# Patient Record
Sex: FEMALE | Race: WHITE | Employment: UNEMPLOYED | URBAN - NONMETROPOLITAN AREA
[De-identification: names, ages, dates, MRNs, and addresses within clinical notes are randomized per-mention and may not be internally consistent; named-entity substitution may affect disease eponyms.]

---
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
## 2022-06-29 ENCOUNTER — OFFICE VISIT (OUTPATIENT)
Dept: CARDIOLOGY CLINIC | Age: 22
End: 2022-06-29
Payer: COMMERCIAL

## 2022-06-29 ENCOUNTER — TELEPHONE (OUTPATIENT)
Dept: CARDIOLOGY CLINIC | Age: 22
End: 2022-06-29

## 2022-06-29 VITALS
WEIGHT: 256.4 LBS | SYSTOLIC BLOOD PRESSURE: 136 MMHG | BODY MASS INDEX: 47.18 KG/M2 | HEIGHT: 62 IN | HEART RATE: 100 BPM | DIASTOLIC BLOOD PRESSURE: 82 MMHG

## 2022-06-29 DIAGNOSIS — I51.4 MYOCARDITIS, UNSPECIFIED CHRONICITY, UNSPECIFIED MYOCARDITIS TYPE (HCC): Primary | ICD-10-CM

## 2022-06-29 PROCEDURE — 4004F PT TOBACCO SCREEN RCVD TLK: CPT | Performed by: INTERNAL MEDICINE

## 2022-06-29 PROCEDURE — G8419 CALC BMI OUT NRM PARAM NOF/U: HCPCS | Performed by: INTERNAL MEDICINE

## 2022-06-29 PROCEDURE — G8427 DOCREV CUR MEDS BY ELIG CLIN: HCPCS | Performed by: INTERNAL MEDICINE

## 2022-06-29 PROCEDURE — 99204 OFFICE O/P NEW MOD 45 MIN: CPT | Performed by: INTERNAL MEDICINE

## 2022-06-29 RX ORDER — DESOGESTREL AND ETHINYL ESTRADIOL 0.15-0.03
KIT ORAL
COMMUNITY
Start: 2022-06-01

## 2022-06-29 RX ORDER — MULTIVITAMIN
1 CAPSULE ORAL DAILY
COMMUNITY

## 2022-06-29 RX ORDER — CLOPIDOGREL BISULFATE 75 MG/1
75 TABLET ORAL DAILY
COMMUNITY
End: 2022-07-14 | Stop reason: ALTCHOICE

## 2022-06-29 RX ORDER — ONDANSETRON HYDROCHLORIDE 8 MG/1
TABLET, FILM COATED ORAL
COMMUNITY
Start: 2022-04-22

## 2022-06-29 RX ORDER — FLUOXETINE HYDROCHLORIDE 40 MG/1
CAPSULE ORAL
COMMUNITY
Start: 2022-04-28

## 2022-06-29 RX ORDER — ASPIRIN 81 MG/1
81 TABLET ORAL DAILY
COMMUNITY

## 2022-06-29 RX ORDER — CARVEDILOL 12.5 MG/1
TABLET ORAL
COMMUNITY
Start: 2022-06-08

## 2022-06-29 RX ORDER — AMITRIPTYLINE HYDROCHLORIDE 50 MG/1
TABLET, FILM COATED ORAL
COMMUNITY
Start: 2022-06-27

## 2022-06-29 NOTE — TELEPHONE ENCOUNTER
Records in [facility] from [facility] [address]. Let me know if there are any other records you need.

## 2022-06-29 NOTE — PROGRESS NOTES
intolerance. Social History  Fausto Fuentes  reports that she has never smoked. She does not have any smokeless tobacco history on file. She reports current alcohol use. She reports that she does not use drugs. Family History  Fausto Fuentes family history is not on file. There is no family history of bicuspid aortic valve, aneurysms, heart transplant, pacemakers, defibrillators, or sudden cardiac death. Past Surgical History   History reviewed. No pertinent surgical history. Review of Systems   Constitutional: Negative for chills and fever  HENT: Negative for congestion, sinus pressure, sneezing and sore throat. Eyes: Negative for pain, discharge, redness and itching. Respiratory: Negative for apnea, cough  Gastrointestinal: Negative for blood in stool, constipation, diarrhea   Endocrine: Negative for cold intolerance, heat intolerance, polydipsia. Genitourinary: Negative for dysuria, enuresis, flank pain and hematuria. Musculoskeletal: Negative for arthralgias, joint swelling and neck pain. Neurological: Negative for numbness and headaches. Psychiatric/Behavioral: Negative for agitation, confusion, decreased concentration and dysphoric mood. Objective:     /82   Pulse 100   Ht 5' 2\" (1.575 m)   Wt 256 lb 6.4 oz (116.3 kg)   BMI 46.90 kg/m²     Wt Readings from Last 3 Encounters:   06/29/22 256 lb 6.4 oz (116.3 kg)   03/04/19 220 lb (99.8 kg) (99 %, Z= 2.20)*     * Growth percentiles are based on CDC (Girls, 2-20 Years) data. BP Readings from Last 3 Encounters:   06/29/22 136/82       Nursing note and vitals reviewed. Physical Exam   Constitutional: Oriented to person, place, and time. Appears well-developed and well-nourished. HENT:   Head: Normocephalic and atraumatic. Eyes: EOM are normal. Pupils are equal, round, and reactive to light. Neck: Normal range of motion. Neck supple. No JVD present.    Cardiovascular: Normal rate, regular rhythm, normal heart sounds and intact distal pulses. No murmur heard. Pulmonary/Chest: Effort normal and breath sounds normal. No respiratory distress. No wheezes. No rales. Abdominal: Soft. Bowel sounds are normal. No distension. There is no tenderness. Musculoskeletal: Normal range of motion. No edema. Neurological: Alert and oriented to person, place, and time. No cranial nerve deficit. Coordination normal.   Skin: Skin is warm and dry. Psychiatric: Normal mood and affect. No results found for: CKTOTAL, CKMB, CKMBINDEX    No results found for: WBC, RBC, HGB, HCT, MCV, MCH, MCHC, RDW, PLT, MPV    No results found for: NA, K, CL, CO2, BUN, LABALBU, CREATININE, CALCIUM, GFRAA, LABGLOM, GLUCOSE, GLU    No results found for: ALKPHOS, ALT, AST, PROT, BILITOT, BILIDIR, IBILI, LABALBU    No results found for: MG    No results found for: INR, PROTIME      No results found for: LABA1C    No results found for: TRIG, HDL, LDLCALC, LDLDIRECT, LABVLDL    No results found for: TSH      Testing Reviewed:      I have individually reviewed the cardiac test below:    ECHO: No results found for this or any previous visit. 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 Will check TTE and get outside records. If no EF issues, no MI, no CVA, will de-escalate therapy. She has no physical limitations. Bluish Discoloration of LE- venous congestion? She had work-up, but not available. Will try to obtain. No physical findings currently    Hemorrhoidal bleeding - GI has seen, she is on DAPT, will need to assess need for length of therapy. Discussed diet/exercise/BP/weight loss/health lifestyle choices/lipids; the patient understands the goals and will try to comply.       Disposition:  6-12 months         Electronically signed by Elvis Drake MD   6/29/2022 at 8:06 AM EDT

## 2022-06-29 NOTE — PROGRESS NOTES
NP, patient had COVID 1/2021 and 1/2022. Patient states she saw a cardiologist in [facility] that diagnosed her with myocarditis. She had testing done at [facility] in 3/2021 and results in care everywhere. She in on Aspirin and Plavix which she states is since COVID but unsure why she is on them.

## 2022-07-07 ENCOUNTER — HOSPITAL ENCOUNTER (OUTPATIENT)
Dept: NON INVASIVE DIAGNOSTICS | Age: 22
Discharge: HOME | End: 2022-07-07
Payer: COMMERCIAL

## 2022-07-07 DIAGNOSIS — I51.4 MYOCARDITIS, UNSPECIFIED CHRONICITY, UNSPECIFIED MYOCARDITIS TYPE (HCC): ICD-10-CM

## 2022-07-07 LAB
LV EF: 63 %
LVEF MODALITY: NORMAL

## 2022-07-07 PROCEDURE — 93306 TTE W/DOPPLER COMPLETE: CPT

## 2022-07-08 NOTE — TELEPHONE ENCOUNTER
Echo results anything needed? Summary   Normal left ventricular size and systolic function. There were no regional wall motion abnormalities. Wall thickness was within normal limits.    Ejection fraction was estimated at 60-65%

## 2022-07-11 ENCOUNTER — TELEPHONE (OUTPATIENT)
Dept: CARDIOLOGY CLINIC | Age: 22
End: 2022-07-11

## 2022-07-11 NOTE — TELEPHONE ENCOUNTER
Echo results  Summary   Normal left ventricular size and systolic function. There were no regional wall motion abnormalities. Wall thickness was within normal limits. Ejection fraction was estimated at 60-65%. OV note from   If no EF issues, no MI, no CVA, will de-escalate therapy. She has no physical limitations. You had recently reviewed records from [facility].

## 2022-07-14 NOTE — TELEPHONE ENCOUNTER
Spoke to patient, notified. [address] [facility] updated.

## 2023-01-19 ENCOUNTER — OFFICE VISIT (OUTPATIENT)
Dept: CARDIOLOGY CLINIC | Age: 23
End: 2023-01-19
Payer: COMMERCIAL

## 2023-01-19 VITALS
HEART RATE: 100 BPM | SYSTOLIC BLOOD PRESSURE: 132 MMHG | WEIGHT: 250.2 LBS | BODY MASS INDEX: 46.04 KG/M2 | HEIGHT: 62 IN | DIASTOLIC BLOOD PRESSURE: 86 MMHG

## 2023-01-19 DIAGNOSIS — I51.4 MYOCARDITIS, UNSPECIFIED CHRONICITY, UNSPECIFIED MYOCARDITIS TYPE (HCC): ICD-10-CM

## 2023-01-19 DIAGNOSIS — I10 HYPERTENSION, UNSPECIFIED TYPE: Primary | ICD-10-CM

## 2023-01-19 PROCEDURE — 99213 OFFICE O/P EST LOW 20 MIN: CPT | Performed by: INTERNAL MEDICINE

## 2023-01-19 PROCEDURE — 4004F PT TOBACCO SCREEN RCVD TLK: CPT | Performed by: INTERNAL MEDICINE

## 2023-01-19 PROCEDURE — G8417 CALC BMI ABV UP PARAM F/U: HCPCS | Performed by: INTERNAL MEDICINE

## 2023-01-19 PROCEDURE — G8427 DOCREV CUR MEDS BY ELIG CLIN: HCPCS | Performed by: INTERNAL MEDICINE

## 2023-01-19 PROCEDURE — 3079F DIAST BP 80-89 MM HG: CPT | Performed by: INTERNAL MEDICINE

## 2023-01-19 PROCEDURE — 93000 ELECTROCARDIOGRAM COMPLETE: CPT | Performed by: INTERNAL MEDICINE

## 2023-01-19 PROCEDURE — G8484 FLU IMMUNIZE NO ADMIN: HCPCS | Performed by: INTERNAL MEDICINE

## 2023-01-19 PROCEDURE — 3075F SYST BP GE 130 - 139MM HG: CPT | Performed by: INTERNAL MEDICINE

## 2023-01-19 RX ORDER — HYDROCHLOROTHIAZIDE 25 MG/1
25 TABLET ORAL DAILY
COMMUNITY
End: 2023-01-19

## 2023-01-19 RX ORDER — CARVEDILOL 25 MG/1
25 TABLET ORAL 2 TIMES DAILY
Qty: 60 TABLET | Refills: 3 | Status: SHIPPED | OUTPATIENT
Start: 2023-01-19

## 2023-01-19 NOTE — PROGRESS NOTES
[address] [facility] [address]  [place] [address]  Dept: [phone]  Dept Fax: [phone]  Loc: [phone]    Visit Date: 1/19/2023    Ms. Sebastien Vance is a 25 y.o. female  who presented for:  Chief Complaint   Patient presents with    6 Month Follow-Up    Check-Up       HPI:   HPI   24 yo F hx of possible COVID myocarditis who presents for follow-up. Doing well,no recurrence. Taking all meds. No chest pain, angina, , orthopnea, PND, sob at rest, palpitations, LE edema, or syncope. No limitations. Current Outpatient Medications:     hydroCHLOROthiazide (HYDRODIURIL) 25 MG tablet, Take 25 mg by mouth daily, Disp: , Rfl:     Ascorbic Acid (KAREL-C PO), Take by mouth, Disp: , Rfl:     ZINC PO, Take by mouth, Disp: , Rfl:     Cyanocobalamin (VITAMIN B12 PO), Take by mouth, Disp: , Rfl:     Cholecalciferol (VITAMIN D3 PO), Take by mouth, Disp: , Rfl:     FLUoxetine (PROZAC) 40 MG capsule, 20 mg, Disp: , Rfl:     APRI 0.15-30 MG-MCG per tablet, TAKE 1 TABLET BY MOUTH EVERY DAY, Disp: , Rfl:     amitriptyline (ELAVIL) 50 MG tablet, , Disp: , Rfl:     ondansetron (ZOFRAN) 8 MG tablet, TAKE 1 TABLET BY MOUTH EVERY 8 HOURS AS NEEDED NAUSEA. *INSURANCE COVERS ONLY 9 PER FILL*, Disp: , Rfl:     carvedilol (COREG) 12.5 MG tablet, TAKE 1 TABLET BY MOUTH TWICE A DAY WITH FOOD, Disp: , Rfl:     Multiple Vitamin (MULTIVITAMIN) capsule, Take 1 capsule by mouth daily, Disp: , Rfl:     aspirin 81 MG EC tablet, Take 81 mg by mouth daily, Disp: , Rfl:     Past Medical History  Nisha Rosa  has a past medical history of COVID and Gluten intolerance. Social History  Nisha oRsa  reports that she has never smoked. She does not have any smokeless tobacco history on file. She reports current alcohol use. She reports that she does not use drugs. Family History  Nisha Rosa family history is not on file.     There is no family history of bicuspid aortic valve, aneurysms, heart transplant, pacemakers, defibrillators, or sudden cardiac death. Past Surgical History   No past surgical history on file. Review of Systems   Constitutional: Negative for chills and fever  HENT: Negative for congestion, sinus pressure, sneezing and sore throat. Eyes: Negative for pain, discharge, redness and itching. Respiratory: Negative for apnea, cough  Gastrointestinal: Negative for blood in stool, constipation, diarrhea   Endocrine: Negative for cold intolerance, heat intolerance, polydipsia. Genitourinary: Negative for dysuria, enuresis, flank pain and hematuria. Musculoskeletal: Negative for arthralgias, joint swelling and neck pain. Neurological: Negative for numbness and headaches. Psychiatric/Behavioral: Negative for agitation, confusion, decreased concentration and dysphoric mood. Objective:     /86   Pulse 100   Ht 5' 2\" (1.575 m)   Wt 250 lb 3.2 oz (113.5 kg)   BMI 45.76 kg/m²     Wt Readings from Last 3 Encounters:   01/19/23 250 lb 3.2 oz (113.5 kg)   06/29/22 256 lb 6.4 oz (116.3 kg)   03/04/19 220 lb (99.8 kg) (99 %, Z= 2.20)*     * Growth percentiles are based on CDC (Girls, 2-20 Years) data. BP Readings from Last 3 Encounters:   01/19/23 132/86   06/29/22 136/82       Nursing note and vitals reviewed. Physical Exam   Constitutional: Oriented to person, place, and time. Appears well-developed and well-nourished. HENT:   Head: Normocephalic and atraumatic. Eyes: EOM are normal. Pupils are equal, round, and reactive to light. Neck: Normal range of motion. Neck supple. No JVD present. Cardiovascular: Normal rate, regular rhythm, normal heart sounds and intact distal pulses. No murmur heard. Pulmonary/Chest: Effort normal and breath sounds normal. No respiratory distress. No wheezes. No rales. Abdominal: Soft. Bowel sounds are normal. No distension. There is no tenderness. Musculoskeletal: Normal range of motion. No edema. Neurological: Alert and oriented to person, place, and time. No cranial nerve deficit. Coordination normal.   Skin: Skin is warm and dry. Psychiatric: Normal mood and affect. No results found for: CKTOTAL, CKMB, CKMBINDEX    No results found for: WBC, RBC, HGB, HCT, MCV, MCH, MCHC, RDW, PLT, MPV    No results found for: NA, K, CL, CO2, BUN, LABALBU, CREATININE, CALCIUM, GFRAA, LABGLOM, GLUCOSE, GLU    No results found for: ALKPHOS, ALT, AST, PROT, BILITOT, BILIDIR, IBILI, LABALBU    No results found for: MG    No results found for: INR, PROTIME      No results found for: LABA1C    No results found for: TRIG, HDL, LDLCALC, LDLDIRECT, LABVLDL    No results found for: TSH      Testing Reviewed:      I have individually reviewed the cardiac test below:    ECHO: No results found for this or any previous visit. Assessment/Plan   HTN  COVID Myocarditis - 3/2021 - EF recovered  Wants to consolidate meds. D/c HCTZ, increase Coreg to 25 mg BID. No other major issues. D/c ASA. The patient was advised on risk/benefits of the new Rx and she agreed to proceed with the medication(s). Discussed diet/exercise/BP/weigh loss/health lifestyle choices/lipids; the patient understands the goals and will try to comply.     Disposition:  prn           Electronically signed by Tim Hicks MD   1/19/2023 at 9:06 AM EST

## 2023-01-19 NOTE — PATIENT INSTRUCTIONS
You may receive a survey regarding the care you received during your visit.  Your input is valuable to us.  We encourage you to complete and return your survey.  We hope you will choose us in the future for your healthcare needs.

## 2023-10-26 ENCOUNTER — HOSPITAL ENCOUNTER (EMERGENCY)
Age: 23
Discharge: HOME | End: 2023-10-26
Payer: COMMERCIAL

## 2023-10-26 VITALS
DIASTOLIC BLOOD PRESSURE: 78 MMHG | SYSTOLIC BLOOD PRESSURE: 134 MMHG | OXYGEN SATURATION: 98 % | TEMPERATURE: 98 F | WEIGHT: 250 LBS | RESPIRATION RATE: 20 BRPM | HEART RATE: 106 BPM | BODY MASS INDEX: 45.73 KG/M2

## 2023-10-26 DIAGNOSIS — J06.9 VIRAL URI WITH COUGH: Primary | ICD-10-CM

## 2023-10-26 PROCEDURE — 99213 OFFICE O/P EST LOW 20 MIN: CPT

## 2023-10-26 RX ORDER — BENZONATATE 200 MG/1
200 CAPSULE ORAL 3 TIMES DAILY PRN
Qty: 30 CAPSULE | Refills: 0 | Status: SHIPPED | OUTPATIENT
Start: 2023-10-26 | End: 2023-11-02

## 2023-10-26 RX ORDER — PREDNISONE 20 MG/1
20 TABLET ORAL 2 TIMES DAILY
Qty: 10 TABLET | Refills: 0 | Status: SHIPPED | OUTPATIENT
Start: 2023-10-26 | End: 2023-10-31

## 2023-10-26 RX ORDER — ALBUTEROL SULFATE 90 UG/1
2 AEROSOL, METERED RESPIRATORY (INHALATION) 4 TIMES DAILY PRN
Qty: 54 G | Refills: 0 | Status: SHIPPED | OUTPATIENT
Start: 2023-10-26

## 2023-10-26 RX ORDER — GUAIFENESIN 600 MG/1
600 TABLET, EXTENDED RELEASE ORAL 2 TIMES DAILY
Qty: 30 TABLET | Refills: 0 | Status: SHIPPED | OUTPATIENT
Start: 2023-10-26 | End: 2023-11-10

## 2023-10-26 ASSESSMENT — PAIN - FUNCTIONAL ASSESSMENT: PAIN_FUNCTIONAL_ASSESSMENT: NONE - DENIES PAIN

## 2023-10-26 ASSESSMENT — ENCOUNTER SYMPTOMS
WHEEZING: 1
COUGH: 1

## 2023-12-11 ENCOUNTER — OFFICE VISIT (OUTPATIENT)
Dept: FAMILY MEDICINE CLINIC | Age: 23
End: 2023-12-11
Payer: COMMERCIAL

## 2023-12-11 VITALS
HEART RATE: 89 BPM | RESPIRATION RATE: 16 BRPM | SYSTOLIC BLOOD PRESSURE: 138 MMHG | DIASTOLIC BLOOD PRESSURE: 94 MMHG | OXYGEN SATURATION: 98 % | TEMPERATURE: 97.5 F | WEIGHT: 253.4 LBS | HEIGHT: 62 IN | BODY MASS INDEX: 46.63 KG/M2

## 2023-12-11 DIAGNOSIS — J01.00 ACUTE MAXILLARY SINUSITIS, RECURRENCE NOT SPECIFIED: ICD-10-CM

## 2023-12-11 DIAGNOSIS — I10 ESSENTIAL HYPERTENSION: ICD-10-CM

## 2023-12-11 DIAGNOSIS — G43.009 MIGRAINE WITHOUT AURA AND WITHOUT STATUS MIGRAINOSUS, NOT INTRACTABLE: ICD-10-CM

## 2023-12-11 DIAGNOSIS — Z00.00 ENCOUNTER FOR WELL ADULT EXAM WITHOUT ABNORMAL FINDINGS: Primary | ICD-10-CM

## 2023-12-11 PROBLEM — I51.4 MYOCARDITIS (HCC): Status: ACTIVE | Noted: 2021-03-23

## 2023-12-11 PROBLEM — E66.01 MORBID OBESITY (HCC): Status: ACTIVE | Noted: 2021-03-23

## 2023-12-11 PROCEDURE — 99395 PREV VISIT EST AGE 18-39: CPT | Performed by: FAMILY MEDICINE

## 2023-12-11 PROCEDURE — 3080F DIAST BP >= 90 MM HG: CPT | Performed by: FAMILY MEDICINE

## 2023-12-11 PROCEDURE — G8484 FLU IMMUNIZE NO ADMIN: HCPCS | Performed by: FAMILY MEDICINE

## 2023-12-11 PROCEDURE — 3075F SYST BP GE 130 - 139MM HG: CPT | Performed by: FAMILY MEDICINE

## 2023-12-11 RX ORDER — AMITRIPTYLINE HYDROCHLORIDE 50 MG/1
50 TABLET, FILM COATED ORAL NIGHTLY
Qty: 90 TABLET | Refills: 3 | Status: SHIPPED | OUTPATIENT
Start: 2023-12-11

## 2023-12-11 RX ORDER — AZITHROMYCIN 250 MG/1
TABLET, FILM COATED ORAL
Qty: 6 TABLET | Refills: 0 | Status: SHIPPED | OUTPATIENT
Start: 2023-12-11 | End: 2023-12-16

## 2023-12-11 RX ORDER — ONDANSETRON HYDROCHLORIDE 8 MG/1
8 TABLET, FILM COATED ORAL EVERY 8 HOURS PRN
Qty: 9 TABLET | Refills: 5 | Status: SHIPPED | OUTPATIENT
Start: 2023-12-11

## 2023-12-11 RX ORDER — CARVEDILOL 3.12 MG/1
3.12 TABLET ORAL 2 TIMES DAILY
Qty: 180 TABLET | Refills: 1 | Status: SHIPPED | OUTPATIENT
Start: 2023-12-11

## 2023-12-11 SDOH — ECONOMIC STABILITY: FOOD INSECURITY: WITHIN THE PAST 12 MONTHS, YOU WORRIED THAT YOUR FOOD WOULD RUN OUT BEFORE YOU GOT MONEY TO BUY MORE.: NEVER TRUE

## 2023-12-11 SDOH — ECONOMIC STABILITY: INCOME INSECURITY: HOW HARD IS IT FOR YOU TO PAY FOR THE VERY BASICS LIKE FOOD, HOUSING, MEDICAL CARE, AND HEATING?: NOT HARD AT ALL

## 2023-12-11 SDOH — ECONOMIC STABILITY: FOOD INSECURITY: WITHIN THE PAST 12 MONTHS, THE FOOD YOU BOUGHT JUST DIDN'T LAST AND YOU DIDN'T HAVE MONEY TO GET MORE.: NEVER TRUE

## 2023-12-11 SDOH — ECONOMIC STABILITY: HOUSING INSECURITY
IN THE LAST 12 MONTHS, WAS THERE A TIME WHEN YOU DID NOT HAVE A STEADY PLACE TO SLEEP OR SLEPT IN A SHELTER (INCLUDING NOW)?: NO

## 2023-12-11 ASSESSMENT — ENCOUNTER SYMPTOMS
ABDOMINAL PAIN: 0
RHINORRHEA: 0
NAUSEA: 0
DIARRHEA: 0
CONSTIPATION: 0
SORE THROAT: 0
SINUS PRESSURE: 1
SHORTNESS OF BREATH: 0
COUGH: 1
VOMITING: 0
CHEST TIGHTNESS: 1
WHEEZING: 1

## 2023-12-11 ASSESSMENT — PATIENT HEALTH QUESTIONNAIRE - PHQ9
SUM OF ALL RESPONSES TO PHQ QUESTIONS 1-9: 0
2. FEELING DOWN, DEPRESSED OR HOPELESS: 0
SUM OF ALL RESPONSES TO PHQ9 QUESTIONS 1 & 2: 0
SUM OF ALL RESPONSES TO PHQ QUESTIONS 1-9: 0
SUM OF ALL RESPONSES TO PHQ QUESTIONS 1-9: 0
1. LITTLE INTEREST OR PLEASURE IN DOING THINGS: 0
SUM OF ALL RESPONSES TO PHQ QUESTIONS 1-9: 0

## 2023-12-11 NOTE — PROGRESS NOTES
Mervin Kayser (:  2000) is a 21 y.o. female,Established patient, here for evaluation of the following chief complaint(s):  New Patient and Annual Exam      Subjective   SUBJECTIVE/OBJECTIVE:  HPI  Diet nothing  Exercise chasing around preschooling  Tobacco use denies  Alcohol use social  Sexually active no   Any concerns about STDs no concerns   Menarche 13 years  LMP 3 weeks ago  Cold symptoms for over 2 months---had CoVid, URI, works in   Needs form completed for   No help with Dayquil, Nyquil, multivitamin, was on steroids and antibiotics in November  Has been off her Prozac for 6 months---mood is doing well  Has also been off her blood pressure medication for 2 months, not checking often, borderline levels, had been getting low blood pressures on the higher dose of Coreg    Review of Systems   Constitutional:  Positive for fatigue. Negative for fever and unexpected weight change. HENT:  Positive for congestion, postnasal drip, sinus pressure and sneezing. Negative for ear pain, rhinorrhea and sore throat. Eyes:  Negative for visual disturbance. Respiratory:  Positive for cough, chest tightness and wheezing. Negative for shortness of breath. Cardiovascular:  Negative for chest pain, palpitations and leg swelling. Gastrointestinal:  Negative for abdominal pain, constipation, diarrhea, nausea and vomiting. Genitourinary:  Negative for dysuria, flank pain, hematuria and urgency. Musculoskeletal:  Negative for arthralgias and myalgias. Skin:  Negative for rash. Neurological:  Positive for dizziness and headaches. Negative for light-headedness. Objective   Physical Exam  Vitals reviewed. Constitutional:       Appearance: She is normal weight. HENT:      Head: Normocephalic and atraumatic. Right Ear: A middle ear effusion is present. Left Ear: A middle ear effusion is present. Nose:      Right Turbinates: Swollen. Left Turbinates: Swollen.

## 2023-12-18 ENCOUNTER — PATIENT MESSAGE (OUTPATIENT)
Dept: FAMILY MEDICINE CLINIC | Age: 23
End: 2023-12-18

## 2024-01-02 NOTE — TELEPHONE ENCOUNTER
Please let patient know that he labs suggest the insulin resistance is mild---we can start with diet and exercise changes, normally recommend doing [id number] calorie diabetic diet, aerobic exercise 3 times per week for 30 minutes  Otherwise, we can talk about starting a medication, I would recommend that she make an appointment if she wants to go that route

## 2024-01-04 NOTE — TELEPHONE ENCOUNTER
Left message patient to makenna back, left message for patient NUSRAT pierre to have patient call back office.

## 2024-03-17 ENCOUNTER — HOSPITAL ENCOUNTER (EMERGENCY)
Age: 24
Discharge: HOME | End: 2024-03-17
Payer: COMMERCIAL

## 2024-03-17 VITALS
RESPIRATION RATE: 18 BRPM | DIASTOLIC BLOOD PRESSURE: 90 MMHG | TEMPERATURE: 98.4 F | SYSTOLIC BLOOD PRESSURE: 132 MMHG | HEART RATE: 104 BPM | OXYGEN SATURATION: 98 %

## 2024-03-17 DIAGNOSIS — J02.0 STREPTOCOCCAL SORE THROAT: Primary | ICD-10-CM

## 2024-03-17 LAB
FLUAV AG SPEC QL: NEGATIVE
FLUBV AG SPEC QL: NEGATIVE
S PYO AG THROAT QL: POSITIVE

## 2024-03-17 PROCEDURE — 99213 OFFICE O/P EST LOW 20 MIN: CPT

## 2024-03-17 PROCEDURE — 87651 STREP A DNA AMP PROBE: CPT

## 2024-03-17 PROCEDURE — 87804 INFLUENZA ASSAY W/OPTIC: CPT

## 2024-03-17 PROCEDURE — 99213 OFFICE O/P EST LOW 20 MIN: CPT | Performed by: NURSE PRACTITIONER

## 2024-03-17 RX ORDER — CEPHALEXIN 500 MG/1
500 CAPSULE ORAL 2 TIMES DAILY
Qty: 20 CAPSULE | Refills: 0 | Status: SHIPPED | OUTPATIENT
Start: 2024-03-17 | End: 2024-03-27

## 2024-03-17 ASSESSMENT — PAIN SCALES - GENERAL: PAINLEVEL_OUTOF10: 5

## 2024-03-17 ASSESSMENT — PAIN - FUNCTIONAL ASSESSMENT: PAIN_FUNCTIONAL_ASSESSMENT: 0-10

## 2024-03-17 ASSESSMENT — PAIN DESCRIPTION - LOCATION: LOCATION: THROAT

## 2024-03-17 NOTE — ED PROVIDER NOTES
[facility] URGENT CARE  UrgentCare Encounter      CHIEFCOMPLAINT       Chief Complaint   Patient presents with    Pharyngitis       Nurses Notes reviewed and I agree except as noted in the HPI.  HISTORY OF PRESENT ILLNESS   Irene Quinn is a 23 y.o. female who presents to the urgent care for evaluation.     She presents for evaluation of a sore throat that started a couple of days ago.    The patient/patient representative has no other acute complaints at this time.    REVIEW OF SYSTEMS     Review of Systems   Constitutional:  Negative for chills, fatigue and fever.   HENT:  Positive for congestion and sore throat. Negative for ear pain and sinus pressure.    Respiratory:  Negative for cough and shortness of breath.    Cardiovascular:  Negative for chest pain.   Gastrointestinal:  Negative for abdominal pain, diarrhea, nausea and vomiting.   Skin:  Negative for rash.   Allergic/Immunologic: Negative for environmental allergies and food allergies.   Neurological:  Negative for headaches.   Hematological:  Negative for adenopathy.       PAST MEDICAL HISTORY         Diagnosis Date    COVID     Gluten intolerance        SURGICAL HISTORY     Patient  has no past surgical history on file.    CURRENT MEDICATIONS       Discharge Medication List as of 3/17/2024 11:54 AM        CONTINUE these medications which have NOT CHANGED    Details   amitriptyline (ELAVIL) 50 MG tablet Take 1 tablet by mouth nightly, Disp-90 tablet, R-3Normal      ondansetron (ZOFRAN) 8 MG tablet Take 1 tablet by mouth every 8 hours as needed for Nausea or Vomiting, Disp-9 tablet, R-5Normal      carvedilol (COREG) 3.125 MG tablet Take 1 tablet by mouth 2 times daily, Disp-180 tablet, R-1Normal      albuterol sulfate HFA (VENTOLIN HFA) 108 (90 Base) MCG/ACT inhaler Inhale 2 puffs into the lungs 4 times daily as needed for Wheezing, Disp-54 g, R-0Normal      Ascorbic Acid (KAREL-C PO) Take by mouthHistorical Med      ZINC PO Take by  Reviewed   STREP SCREEN GROUP A THROAT - Abnormal; Notable for the following components:       Result Value    Rapid Strep A Screen POSITIVE (*)     All other components within normal limits        IMAGING:  No orders to display     URGENT CARE COURSE:     Vitals:    03/17/24 1121   BP: (!) 132/90   Pulse: (!) 104   Resp: 18   Temp: 98.4 °F (36.9 °C)   TempSrc: Oral   SpO2: 98%       Medications - No data to display  PROCEDURES:  FINALIMPRESSION      1. Streptococcal sore throat        DISPOSITION/PLAN   DISPOSITION Decision To Discharge 03/17/2024 11:51:51 AM      ED Course as of 03/17/24 1205   Sun Mar 17, 2024   1150 Rapid Strep A Screen(!): POSITIVE [HA]   1150 Flu A Antigen: Negative [HA]   1150 Flu B Antigen: Negative [HA]      ED Course User Index  [MEDINA] Rose [place], APRN - CNP       Problem List Items Addressed This Visit    None  Visit Diagnoses       Streptococcal sore throat    -  Primary    Relevant Medications    cephALEXin (KEFLEX) 500 MG capsule                 The results of pertinent diagnostic studies and exam findings were discussed with patient/patient representative.   Shared decision-making was performed and patient/patient representative are agreeable that they are suitable for discharge at this time.  The patient’s provisional diagnosis and plan of care were discussed with the patient/patient representative who expressed understanding.  The patient/representative is given strict written and verbal instructions about care at home, including over-the-counter management, prescription information, follow-up, and signs and symptoms of worsening of condition, and the patient/patient representative did verbalize understanding of these instructions.  Patient will go to nearest emergency department if symptoms change or worsen, or for any sign or symptom deemed emergent by the patient or family members. Follow up as an outpatient with PCP within the next 3 days, or sooner if symptoms warrant.

## 2024-03-17 NOTE — ED TRIAGE NOTES
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

## 2024-06-06 DIAGNOSIS — I10 ESSENTIAL HYPERTENSION: ICD-10-CM

## 2024-06-07 RX ORDER — CARVEDILOL 3.12 MG/1
3.12 TABLET ORAL 2 TIMES DAILY
Qty: 60 TABLET | Refills: 5 | Status: SHIPPED | OUTPATIENT
Start: 2024-06-07

## 2024-06-07 NOTE — TELEPHONE ENCOUNTER
Irene Quinn needs refill of   Requested Prescriptions     Pending Prescriptions Disp Refills    carvedilol (COREG) 3.125 MG tablet [Pharmacy Med Name: CARVEDILOL 3.125 MG TABLET] 60 tablet 5     Sig: TAKE 1 TABLET BY MOUTH TWICE A DAY       Last Filled on:  12- #180 R-1 and sent to [facility] in [place], [place] by Dr. Brock.      Last Visit Date:  12/11/2023    Next Visit Date:  Visit date not found

## 2024-07-08 ENCOUNTER — NURSE ONLY (OUTPATIENT)
Dept: LAB | Age: 24
End: 2024-07-08

## 2024-07-09 ENCOUNTER — OFFICE VISIT (OUTPATIENT)
Dept: FAMILY MEDICINE CLINIC | Age: 24
End: 2024-07-09
Payer: COMMERCIAL

## 2024-07-09 VITALS
DIASTOLIC BLOOD PRESSURE: 84 MMHG | TEMPERATURE: 97.5 F | SYSTOLIC BLOOD PRESSURE: 126 MMHG | WEIGHT: 229.8 LBS | HEART RATE: 117 BPM | HEIGHT: 62 IN | RESPIRATION RATE: 16 BRPM | OXYGEN SATURATION: 98 % | BODY MASS INDEX: 42.29 KG/M2

## 2024-07-09 DIAGNOSIS — I10 ESSENTIAL HYPERTENSION: Primary | ICD-10-CM

## 2024-07-09 DIAGNOSIS — G43.009 MIGRAINE WITHOUT AURA AND WITHOUT STATUS MIGRAINOSUS, NOT INTRACTABLE: ICD-10-CM

## 2024-07-09 PROBLEM — I51.4 MYOCARDITIS (HCC): Status: RESOLVED | Noted: 2021-03-23 | Resolved: 2024-07-09

## 2024-07-09 PROCEDURE — 1036F TOBACCO NON-USER: CPT | Performed by: FAMILY MEDICINE

## 2024-07-09 PROCEDURE — 99214 OFFICE O/P EST MOD 30 MIN: CPT | Performed by: FAMILY MEDICINE

## 2024-07-09 PROCEDURE — 3074F SYST BP LT 130 MM HG: CPT | Performed by: FAMILY MEDICINE

## 2024-07-09 PROCEDURE — G8427 DOCREV CUR MEDS BY ELIG CLIN: HCPCS | Performed by: FAMILY MEDICINE

## 2024-07-09 PROCEDURE — 3079F DIAST BP 80-89 MM HG: CPT | Performed by: FAMILY MEDICINE

## 2024-07-09 PROCEDURE — G8417 CALC BMI ABV UP PARAM F/U: HCPCS | Performed by: FAMILY MEDICINE

## 2024-07-09 RX ORDER — CARVEDILOL 3.12 MG/1
3.12 TABLET ORAL 2 TIMES DAILY
Qty: 60 TABLET | Refills: 5 | Status: SHIPPED | OUTPATIENT
Start: 2024-07-09

## 2024-07-09 RX ORDER — AMITRIPTYLINE HYDROCHLORIDE 75 MG/1
75 TABLET ORAL NIGHTLY
Qty: 30 TABLET | Refills: 5 | Status: SHIPPED | OUTPATIENT
Start: 2024-07-09

## 2024-07-09 ASSESSMENT — ENCOUNTER SYMPTOMS
ABDOMINAL PAIN: 0
DIARRHEA: 0
VOMITING: 0
SORE THROAT: 0
SHORTNESS OF BREATH: 0
NAUSEA: 0
CONSTIPATION: 0
COUGH: 0
WHEEZING: 0
RHINORRHEA: 1

## 2024-07-09 ASSESSMENT — PATIENT HEALTH QUESTIONNAIRE - PHQ9
SUM OF ALL RESPONSES TO PHQ9 QUESTIONS 1 & 2: 0
SUM OF ALL RESPONSES TO PHQ QUESTIONS 1-9: 0
2. FEELING DOWN, DEPRESSED OR HOPELESS: NOT AT ALL
1. LITTLE INTEREST OR PLEASURE IN DOING THINGS: NOT AT ALL
SUM OF ALL RESPONSES TO PHQ QUESTIONS 1-9: 0

## 2024-07-09 NOTE — PROGRESS NOTES
Irene Quinn (:  2000) is a 24 y.o. female,Established patient, here for evaluation of the following chief complaint(s):  Follow-up      Subjective   SUBJECTIVE/OBJECTIVE:  HPI  BPs have been fine in the past, slightly high at gyn 130/104, 129/95  Tolerating medications well  Taking medications every day yes, compliant  1200 calories per day, doing more protein, limiting carbs, walking 1-2 miles per day  Last Cr 0.66  Last K 4.5  Last LDL 89  Last glucose 87  Additional concerns new job in the fall at [employer], school starts on the , doing special ed/ inclusive class    Review of Systems   Constitutional:  Positive for unexpected weight change (attempted weight loss). Negative for activity change, appetite change and fatigue.   HENT:  Positive for rhinorrhea (with weather changes). Negative for congestion, ear discharge and sore throat.    Respiratory:  Negative for cough, shortness of breath and wheezing.    Cardiovascular:  Negative for chest pain, palpitations and leg swelling.   Gastrointestinal:  Negative for abdominal pain, constipation, diarrhea, nausea and vomiting.   Neurological:  Positive for headaches (doing well). Negative for dizziness and light-headedness.   Psychiatric/Behavioral:  Positive for sleep disturbance.           Objective   Physical Exam  Vitals reviewed.   Constitutional:       General: She is not in acute distress.     Appearance: Normal appearance. She is obese. She is not ill-appearing.   HENT:      Nose: Rhinorrhea present. No congestion.      Mouth/Throat:      Pharynx: No oropharyngeal exudate or posterior oropharyngeal erythema.   Cardiovascular:      Rate and Rhythm: Normal rate and regular rhythm.      Heart sounds: No murmur heard.     No gallop.   Pulmonary:      Effort: Pulmonary effort is normal.      Breath sounds: Normal breath sounds. No wheezing, rhonchi or rales.   Abdominal:      General: Abdomen is flat. Bowel sounds are normal. There

## 2024-07-12 LAB — CYTOLOGY THIN PREP PAP: NORMAL

## 2024-12-30 LAB
ALBUMIN: 4 G/DL
ALP BLD-CCNC: 82 U/L
ALT SERPL-CCNC: 13 U/L
ANION GAP SERPL CALCULATED.3IONS-SCNC: 14 MMOL/L
AST SERPL-CCNC: 14 U/L
BASOPHILS ABSOLUTE: 0.04 /ΜL
BASOPHILS RELATIVE PERCENT: 0.5 %
BILIRUB SERPL-MCNC: 0.2 MG/DL (ref 0.1–1.4)
BUN BLDV-MCNC: 13 MG/DL
CALCIUM SERPL-MCNC: 9.8 MG/DL
CHLORIDE BLD-SCNC: 108 MMOL/L
CHOLESTEROL, TOTAL: 221 MG/DL
CHOLESTEROL/HDL RATIO: 3.5
CO2: 23 MMOL/L
CREAT SERPL-MCNC: 0.77 MG/DL
EOSINOPHILS ABSOLUTE: 0.28 /ΜL
EOSINOPHILS RELATIVE PERCENT: 3.5 %
GFR, ESTIMATED: 110
GLUCOSE BLD-MCNC: 90 MG/DL
HCT VFR BLD CALC: 40.7 % (ref 36–46)
HDLC SERPL-MCNC: 64 MG/DL (ref 35–70)
HEMOGLOBIN: 13.4 G/DL (ref 12–16)
LDL CHOLESTEROL DIRECT: NORMAL
LDL CHOLESTEROL: 114
LYMPHOCYTES ABSOLUTE: 2.15 /ΜL
LYMPHOCYTES RELATIVE PERCENT: 27 %
MCH RBC QN AUTO: 26.6 PG
MCHC RBC AUTO-ENTMCNC: 32.9 G/DL
MCV RBC AUTO: 81 FL
MONOCYTES ABSOLUTE: 0.36 /ΜL
MONOCYTES RELATIVE PERCENT: 4.5 %
NEUTROPHILS ABSOLUTE: 5.12 /ΜL
NEUTROPHILS RELATIVE PERCENT: 64.4 %
PDW BLD-RTO: 12.6 %
PLATELET # BLD: 267 K/ΜL
PMV BLD AUTO: NORMAL FL
POTASSIUM SERPL-SCNC: 5.1 MMOL/L
RBC # BLD: 5.03 10^6/ΜL
SODIUM BLD-SCNC: 145 MMOL/L
TOTAL PROTEIN: 7 G/DL (ref 6.4–8.2)
TRIGL SERPL-MCNC: 216 MG/DL
VLDLC SERPL CALC-MCNC: 43 MG/DL
WBC # BLD: 8 10^3/ML

## 2024-12-31 ENCOUNTER — PATIENT MESSAGE (OUTPATIENT)
Dept: FAMILY MEDICINE CLINIC | Age: 24
End: 2024-12-31

## 2025-01-09 ENCOUNTER — PATIENT MESSAGE (OUTPATIENT)
Dept: FAMILY MEDICINE CLINIC | Age: 25
End: 2025-01-09

## 2025-01-09 DIAGNOSIS — G43.009 MIGRAINE WITHOUT AURA AND WITHOUT STATUS MIGRAINOSUS, NOT INTRACTABLE: ICD-10-CM

## 2025-01-09 RX ORDER — AMITRIPTYLINE HYDROCHLORIDE 75 MG/1
75 TABLET ORAL NIGHTLY
Qty: 30 TABLET | Refills: 5 | Status: SHIPPED | OUTPATIENT
Start: 2025-01-09

## 2025-01-09 NOTE — TELEPHONE ENCOUNTER
Irene Quinn needs refill of   Requested Prescriptions     Pending Prescriptions Disp Refills    amitriptyline (ELAVIL) 75 MG tablet [Pharmacy Med Name: AMITRIPTYLINE HCL 75 MG TAB] 30 tablet 5     Sig: TAKE 1 TABLET BY MOUTH EVERY DAY AT NIGHT       Last Filled on:  07/09/2024 #30 R-5 sent to [facility] on [address] by Michelle Brock DO.     Last Visit Date:  7/9/2024    Next Visit Date:  Visit date not found

## 2025-01-14 NOTE — TELEPHONE ENCOUNTER
I called the patient at [phone] and received voicemail. I left a message for the patient to give the office a call back and left the office phone number.

## 2025-01-14 NOTE — TELEPHONE ENCOUNTER
Patient never responded to MyChart message   Please let her know that her cholesterol numbers are worse than last year, and I would recommend making an appointment to discuss

## 2025-01-15 NOTE — TELEPHONE ENCOUNTER
Called Kamran at [phone] and received voicemail. I left a message for her to call the office regarding her lab results and scheduling an appointment. Provided the office phone number.

## 2025-01-24 NOTE — TELEPHONE ENCOUNTER
I called the patient and spoke to Adela, patient's mom. I let her know Dr. Brock's response. She voiced understanding and will message the patient and let her know that to also let her know to check her My Chart.